# Patient Record
Sex: FEMALE | Race: WHITE
[De-identification: names, ages, dates, MRNs, and addresses within clinical notes are randomized per-mention and may not be internally consistent; named-entity substitution may affect disease eponyms.]

---

## 2018-06-14 ENCOUNTER — HOSPITAL ENCOUNTER (EMERGENCY)
Dept: HOSPITAL 63 - ER | Age: 38
Discharge: HOME | End: 2018-06-14
Payer: SELF-PAY

## 2018-06-14 VITALS — DIASTOLIC BLOOD PRESSURE: 93 MMHG | SYSTOLIC BLOOD PRESSURE: 135 MMHG
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
## 2018-06-14 DIAGNOSIS — S61.251A: Primary | ICD-10-CM

## 2018-06-14 DIAGNOSIS — S51.852A: ICD-10-CM
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
## 2018-06-14 PROCEDURE — 90471 IMMUNIZATION ADMIN: CPT

## 2018-06-14 PROCEDURE — 90715 TDAP VACCINE 7 YRS/> IM: CPT

## 2018-06-14 NOTE — PHYS DOC
Adult General


Chief Complaint


Chief Complaint:  ANIMAL BITE





[facility]


HPI





Patient is a very pleasant 38 old female who presents for evaluation of a dog 

bite. She states that two of her dogs who are up-to-date with vaccinations were 

fighting and she went to pull them apart and she has some superficial injuries 

to the right index finger and also to the ventral surface of the left forearm 

she has a 0.75 similar laceration which is quite superficial. She is unsure if 

she is up-to-date with her tetanus status so that will be given today. She has 

no medication allergies. She is alert and oriented 4, calm, and appears to be 

no distress this time. She denies any other complaints.





Review of Systems


Review of Systems





Constitutional: Denies fever or chills []


Eyes: Denies change in visual acuity, redness, or eye pain []


HENT: Denies nasal congestion or sore throat []


Respiratory: Denies cough or shortness of breath []


Cardiovascular: No additional information not addressed in HPI []


GI: Denies abdominal pain, nausea, vomiting, bloody stools or diarrhea []


: Denies dysuria or hematuria []


Musculoskeletal: Denies back pain or joint pain []pain with movement of any 

extremity or joint


Integument: Dog bite to right index finger and left forearm


Neurologic: Denies headache, focal weakness or sensory changes []


Endocrine: Denies polyuria or polydipsia []





All other systems were reviewed and found to be within normal limits, except as 

documented in this note.





Physical Exam


Physical Exam





Constitutional: Well developed, well nourished, no acute distress, non-toxic 

appearance. []


HENT: Normocephalic, atraumatic, bilateral external ears normal, oropharynx 

moist, no oral exudates, nose normal. []


Eyes: PERRLA, EOMI, conjunctiva normal, no discharge. [] 


Neck: Normal range of motion, no tenderness, supple, no stridor. [] 


Cardiovascular:Heart rate regular rhythm, no murmur []


Lungs & Thorax:  Bilateral breath sounds clear to auscultation []


Abdomen: Bowel sounds normal, soft, no tenderness, no masses, no pulsatile 

masses. [] 


Skin: 2 small superficial abrasions to the left index finger/superficial 

puncture wounds. To the volar left mid forearm there is a 0.75 cm laceration 

which appears quite superficial.


Back: No tenderness, no CVA tenderness. [] 


Extremities: No tenderness, no cyanosis, no clubbing, ROM intact, no edema. [] 


Neurologic: Alert and oriented X 3, normal motor function, normal sensory 

function, no focal deficits noted. []


Psychologic: Affect normal, judgement normal, mood normal. []





EKG


EKG


[]





Radiology/Procedures


Radiology/Procedures


[]





Course & Med Decision Making


Course & Med Decision Making


Pertinent Labs and Imaging studies reviewed. (See chart for details)





@1545 - patient's wounds irrigated and cleaned and triple antibiotic applied. 

His wounds will heal by secondary intention as they're dog bites and repair of 

the volar forearm wound could lead to increased incidence of infection. The 

patient understands this plan and does not want this to be repaired. Tetanus 

updated. The patient will go home with Augmentin and follow up with her PCP.





Fernando Disclaimer


[place] Disclaimer


This electronic medical record was generated, in whole or in part, using a 

voice recognition dictation system.





Departure


Departure:


Impression:  


 Primary Impression:  


 Dog bite of finger


 Additional Impression:  


 Dog bite of forearm


Disposition:  01 HOME, SELF-CARE


Condition:  STABLE


Referrals:  


[facility]


Patient Instructions:  Animal Bite





Additional Instructions:  


Take the Augmentin as prescribed. Follow-up with your doctor or follow-up with 

one of the doctors from the list provided. Return to the emergency department 

for new or worsening symptoms.


Scripts


Amoxicillin/Potassium Clav (AUGMENTIN 875-125 TABLET) 1 Each Tablet


1 TAB PO BID, #20 TAB


   Prov: SURY AVERY DO         6/14/18





Problem Qualifiers











SURY AVERY DO Jun 14, 2018 15:52